# Patient Record
Sex: MALE | Employment: OTHER | URBAN - METROPOLITAN AREA
[De-identification: names, ages, dates, MRNs, and addresses within clinical notes are randomized per-mention and may not be internally consistent; named-entity substitution may affect disease eponyms.]

---

## 2019-11-14 ENCOUNTER — OFFICE VISIT (OUTPATIENT)
Dept: INTERNAL MEDICINE CLINIC | Facility: CLINIC | Age: 84
End: 2019-11-14
Payer: MEDICARE

## 2019-11-14 VITALS
DIASTOLIC BLOOD PRESSURE: 78 MMHG | RESPIRATION RATE: 14 BRPM | WEIGHT: 150 LBS | BODY MASS INDEX: 22.73 KG/M2 | HEART RATE: 65 BPM | HEIGHT: 68 IN | TEMPERATURE: 97.8 F | OXYGEN SATURATION: 95 % | SYSTOLIC BLOOD PRESSURE: 132 MMHG

## 2019-11-14 DIAGNOSIS — I25.10 CORONARY ARTERY DISEASE INVOLVING NATIVE HEART WITHOUT ANGINA PECTORIS, UNSPECIFIED VESSEL OR LESION TYPE: Primary | ICD-10-CM

## 2019-11-14 DIAGNOSIS — E53.8 VITAMIN B 12 DEFICIENCY: ICD-10-CM

## 2019-11-14 DIAGNOSIS — F03.90 DEMENTIA WITHOUT BEHAVIORAL DISTURBANCE, UNSPECIFIED DEMENTIA TYPE (HCC): ICD-10-CM

## 2019-11-14 DIAGNOSIS — R60.9 PERIPHERAL EDEMA: ICD-10-CM

## 2019-11-14 DIAGNOSIS — E78.5 HYPERLIPIDEMIA, UNSPECIFIED HYPERLIPIDEMIA TYPE: ICD-10-CM

## 2019-11-14 DIAGNOSIS — Z23 NEED FOR VACCINATION: ICD-10-CM

## 2019-11-14 DIAGNOSIS — E03.9 HYPOTHYROIDISM, UNSPECIFIED TYPE: ICD-10-CM

## 2019-11-14 DIAGNOSIS — G89.29 CHRONIC PAIN OF LEFT KNEE: ICD-10-CM

## 2019-11-14 DIAGNOSIS — R26.81 UNSTEADY GAIT: ICD-10-CM

## 2019-11-14 DIAGNOSIS — E53.8 FOLATE DEFICIENCY: ICD-10-CM

## 2019-11-14 DIAGNOSIS — Z12.5 SCREENING FOR PROSTATE CANCER: ICD-10-CM

## 2019-11-14 DIAGNOSIS — Z13.1 SCREENING FOR DIABETES MELLITUS: ICD-10-CM

## 2019-11-14 DIAGNOSIS — M25.562 CHRONIC PAIN OF LEFT KNEE: ICD-10-CM

## 2019-11-14 DIAGNOSIS — Z13.0 SCREENING FOR DEFICIENCY ANEMIA: ICD-10-CM

## 2019-11-14 PROCEDURE — G0008 ADMIN INFLUENZA VIRUS VAC: HCPCS

## 2019-11-14 PROCEDURE — 93000 ELECTROCARDIOGRAM COMPLETE: CPT | Performed by: INTERNAL MEDICINE

## 2019-11-14 PROCEDURE — 99205 OFFICE O/P NEW HI 60 MIN: CPT | Performed by: INTERNAL MEDICINE

## 2019-11-14 PROCEDURE — 90662 IIV NO PRSV INCREASED AG IM: CPT

## 2019-11-15 NOTE — ASSESSMENT & PLAN NOTE
Chronic knee pain the patient is asking if he can have a knee replacement surgery  Indicated there are too many issues that are unclear at the present time to even consider surgical intervention for his knee  After re-evaluate his dimension cardiac status will consider getting an x-ray of the knee and possible orthopedic consultation

## 2019-11-15 NOTE — ASSESSMENT & PLAN NOTE
Evidence of dementia without behavioral disturbance  The patient has difficulty concentrating as well as decreased short-term memory  We scheduled him for metabolic testing including vitamin T04 folic acid thyroid testing screening for diabetes as well as a neuro psychological evaluation will review these studies with the patient and his family after they are completed  He may ultimately need a neurologic evaluation as well    In view of his dementia we have also requested a CT scan of the brain he did have a history of trauma status post fall in the past

## 2019-11-15 NOTE — ASSESSMENT & PLAN NOTE
Mild peripheral edema on today's examination he does not have any evidence of rales in his lungs  It is quite possible he has some mild diastolic congestive heart failure  He will need an echocardiogram down the road after we a TELMA initially evaluate the degree of his dementia  At the present time he does not need any diuretic therapy

## 2019-11-15 NOTE — ASSESSMENT & PLAN NOTE
A history of coronary artery disease he apparently has had several angioplasties in the past he does not know how many  He denies any recent symptoms of angina  We do not have any records for this gentleman at the present time  We have asked for electrocardiogram today which reveals no a arrhythmias nor any evidence of ischemia  He does not seem to be on any statin medication nor any aspirin therapy  It is not known at this time if these are medications that have been previously recommended and the patient was non compliant or E simply is not been prescribed these medications    Request for a lipid profile has been supplied to the patient today he does have some evidence of some peripheral edema on today's examination may have some mild diastolic congestive heart failure he will most likely need a echocardiogram

## 2019-11-15 NOTE — PROGRESS NOTES
Assessment/Plan:    Coronary artery disease involving native heart without angina pectoris  A history of coronary artery disease he apparently has had several angioplasties in the past he does not know how many  He denies any recent symptoms of angina  We do not have any records for this gentleman at the present time  We have asked for electrocardiogram today which reveals no a arrhythmias nor any evidence of ischemia  He does not seem to be on any statin medication nor any aspirin therapy  It is not known at this time if these are medications that have been previously recommended and the patient was non compliant or E simply is not been prescribed these medications  Request for a lipid profile has been supplied to the patient today he does have some evidence of some peripheral edema on today's examination may have some mild diastolic congestive heart failure he will most likely need a echocardiogram     Dementia without behavioral disturbance (HCC)  Evidence of dementia without behavioral disturbance  The patient has difficulty concentrating as well as decreased short-term memory  We scheduled him for metabolic testing including vitamin S57 folic acid thyroid testing screening for diabetes as well as a neuro psychological evaluation will review these studies with the patient and his family after they are completed  He may ultimately need a neurologic evaluation as well  In view of his dementia we have also requested a CT scan of the brain he did have a history of trauma status post fall in the past     Peripheral edema  Mild peripheral edema on today's examination he does not have any evidence of rales in his lungs  It is quite possible he has some mild diastolic congestive heart failure  He will need an echocardiogram down the road after we arthur HOLLIS initially evaluate the degree of his dementia  At the present time he does not need any diuretic therapy      Chronic pain of left knee  Chronic knee pain the patient is asking if he can have a knee replacement surgery  Indicated there are too many issues that are unclear at the present time to even consider surgical intervention for his knee  After re-evaluate his dimension cardiac status will consider getting an x-ray of the knee and possible orthopedic consultation  Unsteady gait  Unsteady gait noted on today's evaluation recommend that the patient ambulated all times with a cane to minimize risk of falls  Diagnoses and all orders for this visit:    Coronary artery disease involving native heart without angina pectoris, unspecified vessel or lesion type  -     POCT ECG    Need for vaccination  -     influenza vaccine, [id number], high-dose, PF 0 5 mL (FLUZONE HIGH-DOSE)    Screening for deficiency anemia  -     CBC and differential; Future    Screening for diabetes mellitus  -     Comprehensive metabolic panel; Future    Hyperlipidemia, unspecified hyperlipidemia type  -     Lipid panel; Future    Screening for prostate cancer  -     PSA, Total Screen; Future    Vitamin B 12 deficiency  -     Vitamin B12; Future    Folate deficiency  -     Folate; Future    Hypothyroidism, unspecified type  -     TSH, 3rd generation with Free T4 reflex; Future    Dementia without behavioral disturbance, unspecified dementia type ([facility] )  -     Ambulatory referral to Neuropsychology; Future  -     CT head wo contrast; Future    Chronic pain of left knee    Peripheral edema        Subjective:      Patient ID: Marley Roblero is a 80 y o  male  This gentleman who is 80years old is a retired   He recently moved into this area from the [place] region  He presents today in the company of his daughter  This gentleman has clear symptoms of dementia his reliability as a historian is uncertain  Some aspects of his medical history are known to his daughter and this may shin has been incorporated into this visit      The patient indicates that he had a fall approximately 4-5 years ago resulting in severe facial bone fractures requiring surgery  The daughter of the patient indicates that since this fall and surgery his cognitive and memory performance have declined  It is not clear if these occurred immediately after the fall or work once a dental and unrelated to the fall  He had a right total knee replacement in the past as well as a surgical repair of left hernia  He has had several angioplasties of his heart he does not know how many he does not know if he has had any myocardial infarctions  It appears that he may have had a history of hypertension in the past he does recall that he did take enalapril in the past     He apparently has had a 30 lb weight reduction since his fall 4-5 years ago  It is not clear if this was due to difficulty eating for period of several months after the facial fractures or simply a decrease in calorie consumption  The patient does not smoke any tobacco products at this time he quit approximately 60 years ago  He does occasionally enjoy a glass of wine  Over 1 hour time in the office was spent with the patient and his family reviewing his medical history as well as physical examination in scheduling testing for this patient      The following portions of the patient's history were reviewed and updated as appropriate:   He  has a past medical history of Fall and Hypertension  He   Patient Active Problem List    Diagnosis Date Noted    Coronary artery disease involving native heart without angina pectoris 11/14/2019    Dementia without behavioral disturbance ([facility] ) 11/14/2019    Chronic pain of left knee 11/14/2019    Peripheral edema 11/14/2019    Unsteady gait 11/14/2019     He  has a past surgical history that includes Knee Arthroplasty; Facial reconstruction surgery; and Hernia repair    His family history includes Cancer in his brother and sister; Coronary artery disease in his father and mother; Gout in his father; Hypertension in his mother; Osteosarcoma in his sister; Stroke in his father  He  reports that he has quit smoking  He has a 40 00 pack-year smoking history  He has never used smokeless tobacco  He reports that he drinks alcohol  He reports that he does not use drugs  No current outpatient medications on file  No current facility-administered medications for this visit       Review of Systems   Constitutional: Positive for unexpected weight change  Cardiovascular: Positive for leg swelling  Musculoskeletal: Positive for arthralgias, gait problem, joint swelling and myalgias  Right knee is status post surgical repair left knee has swelling increased warmth and stiffness   Neurological: Positive for weakness  Psychiatric/Behavioral: Positive for confusion and decreased concentration  All other systems reviewed and are negative  Objective:      /78   Pulse 65   Temp 97 8 °F (36 6 °C)   Resp 14   Ht 5' 8" (1 727 m)   Wt 68 kg (150 lb)   SpO2 95%   BMI 22 81 kg/m²          Physical Exam   Constitutional: He is oriented to person, place, and time  He appears well-developed and well-nourished  No distress  HENT:   Head: Normocephalic and atraumatic  Right Ear: Hearing, tympanic membrane, external ear and ear canal normal    Left Ear: Hearing, tympanic membrane, external ear and ear canal normal    Nose: Nose normal    Mouth/Throat: Oropharynx is clear and moist and mucous membranes are normal  No oropharyngeal exudate  Eyes: Pupils are equal, round, and reactive to light  Conjunctivae are normal  Right eye exhibits no discharge  Left eye exhibits no discharge  No scleral icterus  Neck: No tracheal deviation present  No thyromegaly present  Cardiovascular: Normal rate, regular rhythm, S1 normal, S2 normal and intact distal pulses  Murmur heard  Pulmonary/Chest: Effort normal and breath sounds normal  No stridor  No respiratory distress  He has no wheezes     Abdominal: Soft  Bowel sounds are normal  He exhibits no distension and no mass  There is no tenderness  There is no guarding  Musculoskeletal: Normal range of motion  He exhibits edema and tenderness  Plus one pedal edema bilateral legs  Arthritic changes to the left knee with increased swelling warmth no definite effusion on exam no popliteal cyst palpable   Lymphadenopathy:     He has no cervical adenopathy  Neurological: He is alert and oriented to person, place, and time  He has normal reflexes  He displays normal reflexes  No cranial nerve deficit  He exhibits normal muscle tone  Coordination normal    Skin: Skin is warm and dry  No rash noted  He is not diaphoretic  No erythema  There is pallor  Psychiatric: He has a normal mood and affect   His behavior is normal    Clear the patient has some degree of confusion as well as impaired short-term memory this is confirmed by the patient's daughter

## 2019-11-15 NOTE — ASSESSMENT & PLAN NOTE
Unsteady gait noted on today's evaluation recommend that the patient ambulated all times with a cane to minimize risk of falls

## 2019-11-21 ENCOUNTER — HOSPITAL ENCOUNTER (OUTPATIENT)
Dept: RADIOLOGY | Age: 84
Discharge: HOME | End: 2019-11-21
Payer: MEDICARE

## 2019-11-21 DIAGNOSIS — F03.90 DEMENTIA WITHOUT BEHAVIORAL DISTURBANCE, UNSPECIFIED DEMENTIA TYPE (HCC): ICD-10-CM

## 2019-11-21 PROCEDURE — 70450 CT HEAD/BRAIN W/O DYE: CPT

## 2019-12-04 ENCOUNTER — OFFICE VISIT (OUTPATIENT)
Dept: INTERNAL MEDICINE CLINIC | Facility: CLINIC | Age: 84
End: 2019-12-04
Payer: MEDICARE

## 2019-12-04 VITALS
WEIGHT: 148 LBS | DIASTOLIC BLOOD PRESSURE: 78 MMHG | SYSTOLIC BLOOD PRESSURE: 130 MMHG | BODY MASS INDEX: 22.43 KG/M2 | TEMPERATURE: 98.5 F | OXYGEN SATURATION: 98 % | HEART RATE: 72 BPM | HEIGHT: 68 IN | RESPIRATION RATE: 16 BRPM

## 2019-12-04 DIAGNOSIS — I67.9 CEREBRAL VASCULAR DISEASE: ICD-10-CM

## 2019-12-04 DIAGNOSIS — D64.9 ANEMIA, UNSPECIFIED TYPE: ICD-10-CM

## 2019-12-04 DIAGNOSIS — F01.50 VASCULAR DEMENTIA WITHOUT BEHAVIORAL DISTURBANCE (HCC): ICD-10-CM

## 2019-12-04 DIAGNOSIS — R26.81 UNSTEADY GAIT: ICD-10-CM

## 2019-12-04 DIAGNOSIS — I25.10 CORONARY ARTERY DISEASE INVOLVING NATIVE HEART WITHOUT ANGINA PECTORIS, UNSPECIFIED VESSEL OR LESION TYPE: ICD-10-CM

## 2019-12-04 DIAGNOSIS — F03.90 DEMENTIA WITHOUT BEHAVIORAL DISTURBANCE, UNSPECIFIED DEMENTIA TYPE (HCC): Primary | ICD-10-CM

## 2019-12-04 PROCEDURE — 99215 OFFICE O/P EST HI 40 MIN: CPT | Performed by: INTERNAL MEDICINE

## 2019-12-04 RX ORDER — ROSUVASTATIN CALCIUM 5 MG/1
5 TABLET, COATED ORAL 3 TIMES WEEKLY
Qty: 30 TABLET | Refills: 3 | Status: SHIPPED | OUTPATIENT
Start: 2019-12-04

## 2019-12-05 NOTE — PROGRESS NOTES
Assessment/Plan:    Coronary artery disease involving native heart without angina pectoris  The patient remains stable from a cardiovascular perspective denying any chest pains or palpitations  He continues to have some mild edema of his lower extremities  In view of his history of heart disease I have asked him to initiate aspirin 81 mg 3 times a week  He indicates that in the past he was on aspirin but stopped that because of excessive bruising  In addition I have asked him to start Crestor 5 mg 3 times a week  A believe with his history of heart disease as well as what appears to be vascular dementia he should be on a combination of both aspirin and statin therapy  He indicates in the past he did take simvastatin but has not use the medication in quite sometime  His LDL is at the top end of the normal range with a value of 99  He is encouraged to maintain a low-cholesterol diet  Cerebral vascular disease  Review of the patient's CT scan of the brain does show evidence of cerebral vascular disease I reviewed the CT scan with the patient and his daughter  I recommend the initiation of aspirin 81 mg 3 times a week as well as Crestor 5 mg 3 times a week  Vascular dementia ([address] )  Symptoms of dementia likely vascular but with some component of Alzheimer's as well as the patient seems to have a deficiency in his awareness of the extent of his cognitive deficiencies  He does have vascular disease on his CT scan of the brain as well as some atrophy of the brain  Dementia without behavioral disturbance ([address] )  Dementia without behavioral disturbances with some possible components of Alzheimer's disease  Patient has been provided with a referral to a neuro psychologist for neuro psychological evaluation  Pending the outcome of this study we may consider adding a Alzheimer's modifying medication such as Aricept      Unsteady gait  Patient continues with an unsteady gait he should use a cane as a minimum steady Ng device at all times  Anemia  Anemia reviewed with the patient today his most recent blood work shows a below normal hemoglobin  He denies any evidence of any blood in his stools  He denies any blood in his urine  Of the indices do not particularly support a iron or K36 or folic acid deficiency  V34 and folic acid levels were both checked with his blood work indicating normal values  I recommend continued observation  The patient recalls that he was told years ago that he has anemia but is not aware of how the workup proceeded or if it was ever performed  At the present time he does not want to proceed with any additional testing will check a CBC again with his next visit  Diagnoses and all orders for this visit:    Dementia without behavioral disturbance, unspecified dementia type ([address] )    Unsteady gait    Coronary artery disease involving native heart without angina pectoris, unspecified vessel or lesion type  -     rosuvastatin (CRESTOR) 5 mg tablet; Take 1 tablet (5 mg total) by mouth 3 (three) times a week    Anemia, unspecified type  -     CBC and differential; Future    Vascular dementia without behavioral disturbance (HCC)  -     aspirin 81 MG tablet; Take 1 tablet (81 mg total) by mouth daily    Cerebral vascular disease        Subjective:      Patient ID: Yesenia Dewitt is a 80 y o  male  This 22-year-old gentleman returns today in the company of his daughter to review blood studies as well as CT scan of his brain performed since his last visit with us  He presented with symptoms of dementia and further evaluation included blood work to indicate any metabolic issues as well as CT scanning of the brain  The patient has a history of coronary artery disease and denies any symptoms of chest pain palpitations or shortness of breath  Amazingly he indicates that he has not been on any medications at the time of his transfer to our area    He denies use of any statins or aspirin type products recently  The following portions of the patient's history were reviewed and updated as appropriate:   He  has a past medical history of Fall and Hypertension  He   Patient Active Problem List    Diagnosis Date Noted    Vascular dementia ([facility] ) 12/04/2019    Cerebral vascular disease 12/04/2019    Anemia 12/04/2019    Coronary artery disease involving native heart without angina pectoris 11/14/2019    D[facility] 11/14/2019    Chronic pain of left knee 11/14/2019    Peripheral edema 11/14/2019    Unsteady gait 11/14/2019     He  has a past surgical history that includes Knee Arthroplasty; Facial reconstruction surgery; and Hernia repair  His family history includes Cancer in his brother and sister; Coronary artery disease in his father and mother; Gout in his father; Hypertension in his mother; Osteosarcoma in his sister; Stroke in his father  He  reports that he has quit smoking  He has a 40 00 pack-year smoking history  He has never used smokeless tobacco  He reports that he drinks alcohol  He reports that he does not use drugs  Current Outpatient Medications   Medication Sig Dispense Refill    aspirin 81 MG tablet Take 1 tablet (81 mg total) by mouth 3 (three) times a week 100 tablet 2    rosuvastatin (CRESTOR) 5 mg tablet Take 1 tablet (5 mg total) by mouth 3 (three) times a week 30 tablet 3     No current facility-administered medications for this visit       Review of Systems   Psychiatric/Behavioral: Positive for confusion and decreased concentration  All other systems reviewed and are negative  Objective:      /78 (BP Location: Left arm, Patient Position: Sitting, Cuff Size: Adult)   Pulse 72   Temp 98 5 °F (36 9 °C) (Tympanic)   Resp 16   Ht 5' 8" (1 727 m)   Wt 67 1 kg (148 lb)   SpO2 98%   BMI 22 50 kg/m²          Physical Exam   Constitutional: He is oriented to person, place, and time   He appears well-developed and well-nourished  No distress  HENT:   Head: Normocephalic and atraumatic  Right Ear: Hearing, tympanic membrane, external ear and ear canal normal    Left Ear: Hearing, tympanic membrane, external ear and ear canal normal    Nose: Nose normal    Mouth/Throat: Oropharynx is clear and moist and mucous membranes are normal  No oropharyngeal exudate  Eyes: Pupils are equal, round, and reactive to light  Conjunctivae are normal  Right eye exhibits no discharge  Left eye exhibits no discharge  No scleral icterus  Neck: No tracheal deviation present  No thyromegaly present  Cardiovascular: Normal rate, regular rhythm, S1 normal, S2 normal and intact distal pulses  Murmur heard  Pulmonary/Chest: Effort normal and breath sounds normal  No stridor  No respiratory distress  He has no wheezes  Abdominal: Soft  Bowel sounds are normal  He exhibits no distension and no mass  There is no tenderness  There is no guarding  Musculoskeletal: Normal range of motion  He exhibits edema and tenderness  Plus one pedal edema bilateral legs  Arthritic changes to the left knee with increased swelling warmth no definite effusion on exam no popliteal cyst palpable   Lymphadenopathy:     He has no cervical adenopathy  Neurological: He is alert and oriented to person, place, and time  He has normal reflexes  He displays normal reflexes  No cranial nerve deficit  He exhibits normal muscle tone  Coordination normal    Skin: Skin is warm and dry  No rash noted  He is not diaphoretic  No erythema  No pallor  Psychiatric: He has a normal mood and affect   His behavior is normal    Clear the patient has some degree of confusion as well as impaired short-term memory this is confirmed by the patient's daughter

## 2019-12-05 NOTE — ASSESSMENT & PLAN NOTE
The patient remains stable from a cardiovascular perspective denying any chest pains or palpitations  He continues to have some mild edema of his lower extremities  In view of his history of heart disease I have asked him to initiate aspirin 81 mg 3 times a week  He indicates that in the past he was on aspirin but stopped that because of excessive bruising  In addition I have asked him to start Crestor 5 mg 3 times a week  A believe with his history of heart disease as well as what appears to be vascular dementia he should be on a combination of both aspirin and statin therapy  He indicates in the past he did take simvastatin but has not use the medication in quite sometime  His LDL is at the top end of the normal range with a value of 99  He is encouraged to maintain a low-cholesterol diet

## 2019-12-05 NOTE — ASSESSMENT & PLAN NOTE
Patient continues with an unsteady gait he should use a cane as a minimum steady Ng device at all times

## 2019-12-05 NOTE — ASSESSMENT & PLAN NOTE
Dementia without behavioral disturbances with some possible components of Alzheimer's disease  Patient has been provided with a referral to a neuro psychologist for neuro psychological evaluation  Pending the outcome of this study we may consider adding a Alzheimer's modifying medication such as Aricept

## 2019-12-05 NOTE — ASSESSMENT & PLAN NOTE
Symptoms of dementia likely vascular but with some component of Alzheimer's as well as the patient seems to have a deficiency in his awareness of the extent of his cognitive deficiencies  He does have vascular disease on his CT scan of the brain as well as some atrophy of the brain

## 2019-12-05 NOTE — ASSESSMENT & PLAN NOTE
Review of the patient's CT scan of the brain does show evidence of cerebral vascular disease I reviewed the CT scan with the patient and his daughter  I recommend the initiation of aspirin 81 mg 3 times a week as well as Crestor 5 mg 3 times a week

## 2019-12-05 NOTE — ASSESSMENT & PLAN NOTE
Anemia reviewed with the patient today his most recent blood work shows a below normal hemoglobin  He denies any evidence of any blood in his stools  He denies any blood in his urine  Of the indices do not particularly support a iron or K10 or folic acid deficiency  X76 and folic acid levels were both checked with his blood work indicating normal values  I recommend continued observation  The patient recalls that he was told years ago that he has anemia but is not aware of how the workup proceeded or if it was ever performed  At the present time he does not want to proceed with any additional testing will check a CBC again with his next visit